# Patient Record
Sex: MALE | Race: BLACK OR AFRICAN AMERICAN | NOT HISPANIC OR LATINO | URBAN - METROPOLITAN AREA
[De-identification: names, ages, dates, MRNs, and addresses within clinical notes are randomized per-mention and may not be internally consistent; named-entity substitution may affect disease eponyms.]

---

## 2020-09-01 ENCOUNTER — EMERGENCY (EMERGENCY)
Facility: HOSPITAL | Age: 35
LOS: 1 days | Discharge: HOME | End: 2020-09-01
Attending: EMERGENCY MEDICINE
Payer: COMMERCIAL

## 2020-09-01 VITALS
RESPIRATION RATE: 18 BRPM | SYSTOLIC BLOOD PRESSURE: 114 MMHG | TEMPERATURE: 98 F | WEIGHT: 184.97 LBS | HEIGHT: 74 IN | OXYGEN SATURATION: 98 % | HEART RATE: 88 BPM | DIASTOLIC BLOOD PRESSURE: 78 MMHG

## 2020-09-01 PROCEDURE — 90715 TDAP VACCINE 7 YRS/> IM: CPT

## 2020-09-01 PROCEDURE — 99283 EMERGENCY DEPT VISIT LOW MDM: CPT

## 2020-09-01 PROCEDURE — 73110 X-RAY EXAM OF WRIST: CPT

## 2020-09-01 PROCEDURE — 73110 X-RAY EXAM OF WRIST: CPT | Mod: 26,RT

## 2020-09-01 PROCEDURE — 90471 IMMUNIZATION ADMIN: CPT

## 2020-09-01 PROCEDURE — 73130 X-RAY EXAM OF HAND: CPT | Mod: 26,RT

## 2020-09-01 PROCEDURE — 73130 X-RAY EXAM OF HAND: CPT

## 2020-09-01 PROCEDURE — 99284 EMERGENCY DEPT VISIT MOD MDM: CPT | Mod: 25

## 2020-09-01 PROCEDURE — 99053 MED SERV 10PM-8AM 24 HR FAC: CPT

## 2020-09-01 RX ORDER — TETANUS TOXOID, REDUCED DIPHTHERIA TOXOID AND ACELLULAR PERTUSSIS VACCINE, ADSORBED 5; 2.5; 8; 8; 2.5 [IU]/.5ML; [IU]/.5ML; UG/.5ML; UG/.5ML; UG/.5ML
0.5 SUSPENSION INTRAMUSCULAR ONCE
Refills: 0 | Status: COMPLETED | OUTPATIENT
Start: 2020-09-01 | End: 2020-09-01

## 2020-09-01 RX ADMIN — TETANUS TOXOID, REDUCED DIPHTHERIA TOXOID AND ACELLULAR PERTUSSIS VACCINE, ADSORBED 0.5 MILLILITER(S): 5; 2.5; 8; 8; 2.5 SUSPENSION INTRAMUSCULAR at 08:50

## 2020-09-01 NOTE — ED PROVIDER NOTE - GASTROINTESTINAL, MLM
Abdomen soft, non-tender **ATTENDING ADDENDUM (Dr. Ivan Lopez): NO guarding, rebound, or rigidity. NO pulsatile or non-pulsatile masses. NO hernias. NO obvious hepatosplenomegaly.
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
## 2020-09-01 NOTE — ED PROVIDER NOTE - PLAN OF CARE
**ATTENDING ADDENDUM (Dr. Ivan Lopez): Goals of care include resolution of emergent/urgent symptoms and concerns, and restoration to baseline level of homeostasis.

## 2020-09-01 NOTE — ED PROVIDER NOTE - PHYSICAL EXAMINATION
GEN - NAD; well appearing; A+Ox3   HEAD - NC/AT, No visible Ecchymosis, No Abrasions, No Lacerations/Skin Tears     EYES - EOMI, PERRL, no conjunctival pallor, no scleral icterus  ENT -   mucous membranes  moist , no discharge      NECK - Neck supple, No LAD, No Swelling, FROM in C-Spine 45 deg to either side without pain  PULM - CTA B/L,  symmetric breath sounds  COR -  RRR, S1 S2, no murmurs  ABD - NT/ND, soft, no guarding, no rebound, no masses    BACK - no CVA tenderness, nontender midline CTL spine - no gross stepoffs or deformities     EXTREMS - (L) Wrist without any bony tenderness, FROM in joint; sensory intact; 2+ Pulses B/L UE and LE; 0+ edema, no gross deformity, warm and well perfused, no calf tenderness/swelling/erythema    SKIN - (+) L Wrist Lateral Abrasion - hemostatic      NEUROLOGIC - alert, CN3-12 intact, sensation nl, moving all 4 ext GEN - NAD; well appearing; A+Ox3   HEAD - NC/AT, No visible Ecchymosis, No Abrasions, No Lacerations/Skin Tears     EYES - EOMI, PERRL, no conjunctival pallor, no scleral icterus  ENT -   mucous membranes  moist , no discharge      NECK - Neck supple, No LAD, No Swelling, FROM in C-Spine 45 deg to either side without pain  PULM - CTA B/L,  symmetric breath sounds  COR -  RRR, S1 S2, no murmurs  ABD - NT/ND, soft, no guarding, no rebound, no masses    BACK - no CVA tenderness, nontender midline CTL spine - no gross stepoffs or deformities     EXTREMS - (R) Wrist without any bony tenderness, FROM in joint; sensory intact; 2+ Pulses B/L UE and LE; 0+ edema, no gross deformity, warm and well perfused, no calf tenderness/swelling/erythema    SKIN - (+) R Wrist Lateral Abrasion - hemostatic      NEUROLOGIC - alert, CN3-12 intact, sensation nl, moving all 4 ext GEN - NAD; well appearing; A+Ox3   HEAD - NC/AT, No visible Ecchymosis, No Abrasions, No Lacerations/Skin Tears     EYES - EOMI, PERRL, no conjunctival pallor, no scleral icterus  ENT -   mucous membranes  moist , no discharge      NECK - Neck supple, No LAD, No Swelling, FROM in C-Spine 45 deg to either side without pain  PULM - CTA B/L,  symmetric breath sounds  COR -  RRR, S1 S2, no murmurs  ABD - NT/ND, soft, no guarding, no rebound, no masses    BACK - no CVA tenderness, nontender midline CTL spine - no gross stepoffs or deformities     EXTREMS - (R) Wrist without any bony tenderness, FROM in joint; sensory intact; 2+ Pulses B/L UE and LE; 0+ edema, no gross deformity, warm and well perfused, no calf tenderness/swelling/erythema    SKIN - (+) R Wrist Lateral Abrasion - hemostatic      NEUROLOGIC - alert, CN3-12 intact, sensation nl, moving all 4 ext  **ATTENDING ADDENDUM (Dr. Ivan Lopez): I have reviewed and substantially contributed to the elements of the PE as documented above. I have directly performed an examination of this patient in conjunction with the other members (EM resident/PA/NP) of the patient care team. I have personally reviewed the patient's vital signs at the time of the patient's initial presentation to the ED and repeatedly throughout the ED course. Of note, and in addition to the above, there is NO right navicular tenderness with palpation. POSITIVE full range of motion with flexion, extension, rotation (pronation/supination), and ulnar/radial deviation of the right wrist. Right hand dominant.
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
## 2020-09-01 NOTE — ED PROVIDER NOTE - RESPIRATORY, MLM
Breath sounds clear and equal bilaterally. **ATTENDING ADDENDUM (Dr. Ivan Lopez): BREATHING CLEAR. POSITIVE BILATERAL BREATH SOUNDS auscultated. NO stridor, drooling, tripoding, or wheezing. POSITIVE bilateral chest wall expansion WITHOUT crepitus, tenderness, or deformity. POSITIVE midline trachea.
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
## 2020-09-01 NOTE — ED PROVIDER NOTE - NEUROLOGICAL, MLM
Alert and oriented, no focal deficits, no motor or sensory deficits. **ATTENDING ADDENDUM (Dr. Ivan Lopez): [place] coma score = 15 (eyes =4, verbal =5, motor =6). NO posturing. NO focal motor weakness. NO sensory changes. Awake, alert, coherent, interactive, and oriented to person, place, and time.
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
## 2020-09-01 NOTE — ED PROVIDER NOTE - CHPI ED SYMPTOMS NEG
no loss of consciousness/no difficulty bearing weight/no neck tenderness/no decreased eating/drinking/no back pain/no disorientation/no headache

## 2020-09-01 NOTE — ED PROVIDER NOTE - EYES, MLM
Clear bilaterally, pupils equal, round and reactive to light. **ATTENDING ADDENDUM (Dr. Ivan Lopez): Extraocular muscle movements intact. Clear corneas bilaterally, pupils equal and round. NO nystagmus.
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
## 2020-09-01 NOTE — ED PROVIDER NOTE - ENMT, MLM
Airway patent, Nasal mucosa clear. Mouth with normal mucosa. Throat has no vesicles, no oropharyngeal exudates and uvula is midline. **ATTENDING ADDENDUM (Dr. Ivan Lopez): AIRWAY CLEAR. NO pooling of secretions, POSITIVE gag reflex, NO debris, ABLE TO SELF-PROTECT AIRWAY. POSITIVE full range of motion of the mandible. NO temporomandibular joint tenderness with range of motion or palpation. NO dental trauma. NO malocclusion. NO facial or nasal deformity or bony tenderness. NO epistaxis or septal hematoma noted.

## 2020-09-01 NOTE — ED PROVIDER NOTE - PROGRESS NOTE DETAILS
**ATTENDING ADDENDUM (Dr. Ivan Lopez): patient serially evaluated throughout ED course. NO acute deterioration up to this time in the ED. Improved. Declines analgesics. ED diagnostics up to this time acknowledged, reviewed and noted. NO evidence of fracture or dislocation of the right wrist carpals or forearm bones. Agree with volar splint, outpatient followup with primary care physician/provider, and with analgesics/NSAIDS as needed. Extensive anticipatory guidance provided to patient +/or family member(s). NO evidence of cervical spine or head injury at this time. XRay Wet Read: without any acute fracture or dislocation. Will ACE wrap and discharge home. XRay Wet Read: without any acute fracture or dislocation. Will ACE wrap and discharge home.  **ATTENDING ADDENDUM (Dr. Ivan Lopez): patient serially evaluated throughout ED course. NO acute deterioration up to this time in the ED. Agree with above notation by EM resident Timo. ED diagnostics up to this time acknowledged, reviewed and noted. Agree with discharge home with close outpatient followup as instructed. DENIES need for analgesics or NSAIDS Here in ED. NO evidence of fracture, dislocation, compartment syndrome, foreign body, neurovascular injury or other worrisome injury to the right upper extremity requiring additional diagnostics at this time. NO evidence of occult navicular fracture. Extensive anticipatory guidance provided to patient +/or family member(s) throughout ED course by ED team.

## 2020-09-01 NOTE — ED PROVIDER NOTE - PATIENT PORTAL LINK FT
You can access the FollowMyHealth Patient Portal offered by [facility] by registering at the following website: [email or web address]. By joining [employer]’s FollowMyHealth portal, you will also be able to view your health information using other applications (apps) compatible with our system.

## 2020-09-01 NOTE — ED PROVIDER NOTE - ATTENDING CONTRIBUTION TO CARE
**ATTENDING ADDENDUM (Dr. Ivan Lopez): I attest that I have directly examined this patient and reviewed and formulated the diagnostic and therapeutic management plan in collaboration with the EM resident. Please see MDM note and remainder of EMR for findings from CC, HPI, ROS, and PE. (Timo)

## 2020-09-01 NOTE — ED PROVIDER NOTE - NS ED ROS FT
Constitution: No Fever or chills, No Weight Loss,   Eyes: No visual changes  HEENT: No cough, No Discharge, No Rhinorrhea, No URI symptoms  Cardio: No Chest pain, No Palpitations, No Dyspnea  Resp: No SOB, No Wheezing  GI: No abdominal pain, No Nausea, No Vomiting, No Constipation, No Diarrhea  : No burning upon urination, trouble urinating, no foul odor from urine  MSK: (+) L Wrist Pain; No Back pain, No Numbness, No Tingling, No Weakness  Neuro: No Headache, No changes to Vision, No changes to Hearing, Normal Gait  Skin: (+) L Wrist Abrasion; No rashes, No Bruising, No Swelling Constitution: No Fever or chills, No Weight Loss,   Eyes: No visual changes  HEENT: No cough, No Discharge, No Rhinorrhea, No URI symptoms  Cardio: No Chest pain, No Palpitations, No Dyspnea  Resp: No SOB, No Wheezing  GI: No abdominal pain, No Nausea, No Vomiting, No Constipation, No Diarrhea  : No burning upon urination, trouble urinating, no foul odor from urine  MSK: (+) R Wrist Pain; No Back pain, No Numbness, No Tingling, No Weakness  Neuro: No Headache, No changes to Vision, No changes to Hearing, Normal Gait  Skin: (+) R Wrist Abrasion; No rashes, No Bruising, No Swelling Constitution: No Fever or chills, No Weight Loss,   Eyes: No visual changes  HEENT: No cough, No Discharge, No Rhinorrhea, No URI symptoms  Cardio: No Chest pain, No Palpitations, No Dyspnea  Resp: No SOB, No Wheezing  GI: No abdominal pain, No Nausea, No Vomiting, No Constipation, No Diarrhea  : No burning upon urination, trouble urinating, no foul odor from urine  MSK: (+) R Wrist Pain; No Back pain, No Numbness, No Tingling, No Weakness  Neuro: No Headache, No changes to Vision, No changes to Hearing, Normal Gait  Skin: (+) R Wrist Abrasion; No rashes, No Bruising, No Swelling  **ATTENDING ADDENDUM (Dr. Ivan Lopez): During my interview with the patient, I have personally obtained and/or have directly verified the elements in the past medical/surgical history and other histories as noted earlier in the EMR, in conjunction with the other members (EM resident/PA/NP) of the patient care team. I have also personally obtained and/or have directly verified/reviewed the review of systems as documented below, in conjunction with the other members (EM resident/PA/NP) of the patient care team.

## 2022-09-25 NOTE — ED PROVIDER NOTE - MUSCULOSKELETAL NECK EXAM
normal...
no deformity, pain or tenderness. no restriction of movement/supple/trachea midline/**ATTENDING ADDENDUM (Dr. Ivan Lopez): NO cervical-thoracic-lumbar-sacral midline bony tenderness or stepoff.